# Patient Record
Sex: FEMALE | Race: WHITE | URBAN - METROPOLITAN AREA
[De-identification: names, ages, dates, MRNs, and addresses within clinical notes are randomized per-mention and may not be internally consistent; named-entity substitution may affect disease eponyms.]

---

## 2021-06-24 ENCOUNTER — TELEHEALTH PROVIDED OTHER THAN IN PATIENT'S HOME (OUTPATIENT)
Dept: URBAN - METROPOLITAN AREA TELEHEALTH 7 | Facility: TELEHEALTH | Age: 26
End: 2021-06-24
Payer: MEDICAID

## 2021-06-24 VITALS — WEIGHT: 170 LBS | HEIGHT: 63 IN

## 2021-06-24 DIAGNOSIS — K21.0 GASTRO-ESOPHAGEAL REFLUX DISEASE WITH ESOPHAGITIS: ICD-10-CM

## 2021-06-24 DIAGNOSIS — R19.8 OTHER SPECIFIED SYMPTOMS AND SIGNS INVOLVING THE DIGESTIVE S: ICD-10-CM

## 2021-06-24 PROCEDURE — 99204 OFFICE O/P NEW MOD 45 MIN: CPT | Mod: 95 | Performed by: PHYSICIAN ASSISTANT

## 2021-06-24 NOTE — SERVICEHPINOTES
PATIENT VERIFIED BY DATE OF BIRTH AND NAME. Patient has been consented for this telecommunication visit.   LIVIA BRYANT   is a   26   year old    female who is being seen in consultation at the request of   NORMA GALAN   for chronic lump in throat. Has had issues for a few years but symptoms have worsened. Has difficulty swallowing at times and feels like she doesn't have a lot of room to swallow. No pain with swallowing. Has seen ENT, had modified barium swallow which was ok. Advised to see us to R/O EoE or other abnormal findings. She has acid reflux 1-2x/week and will use Tums for this. Infrequent NSAID use. No N/V. She has long h/o IBS-type symptoms and was seen by us in 2016. No new concerns today.  Reports routine labs recently with PCP which she says were ok.

## 2021-07-02 ENCOUNTER — OFFICE (OUTPATIENT)
Dept: URBAN - METROPOLITAN AREA CLINIC 98 | Facility: CLINIC | Age: 26
End: 2021-07-02
Payer: MEDICAID

## 2021-07-02 VITALS
DIASTOLIC BLOOD PRESSURE: 60 MMHG | RESPIRATION RATE: 16 BRPM | HEART RATE: 55 BPM | DIASTOLIC BLOOD PRESSURE: 75 MMHG | HEART RATE: 70 BPM | SYSTOLIC BLOOD PRESSURE: 97 MMHG | HEART RATE: 81 BPM | HEIGHT: 63 IN | DIASTOLIC BLOOD PRESSURE: 75 MMHG | OXYGEN SATURATION: 99 % | SYSTOLIC BLOOD PRESSURE: 100 MMHG | DIASTOLIC BLOOD PRESSURE: 63 MMHG | DIASTOLIC BLOOD PRESSURE: 73 MMHG | SYSTOLIC BLOOD PRESSURE: 102 MMHG | DIASTOLIC BLOOD PRESSURE: 58 MMHG | SYSTOLIC BLOOD PRESSURE: 109 MMHG | OXYGEN SATURATION: 99 % | HEART RATE: 78 BPM | SYSTOLIC BLOOD PRESSURE: 97 MMHG | DIASTOLIC BLOOD PRESSURE: 63 MMHG | SYSTOLIC BLOOD PRESSURE: 109 MMHG | RESPIRATION RATE: 11 BRPM | HEIGHT: 63 IN | TEMPERATURE: 98.2 F | SYSTOLIC BLOOD PRESSURE: 107 MMHG | TEMPERATURE: 98.2 F | HEART RATE: 61 BPM | WEIGHT: 170 LBS | SYSTOLIC BLOOD PRESSURE: 115 MMHG | RESPIRATION RATE: 12 BRPM | SYSTOLIC BLOOD PRESSURE: 115 MMHG | DIASTOLIC BLOOD PRESSURE: 62 MMHG | OXYGEN SATURATION: 100 % | DIASTOLIC BLOOD PRESSURE: 73 MMHG | OXYGEN SATURATION: 96 % | RESPIRATION RATE: 11 BRPM | DIASTOLIC BLOOD PRESSURE: 62 MMHG | WEIGHT: 170 LBS | HEART RATE: 70 BPM | RESPIRATION RATE: 16 BRPM | SYSTOLIC BLOOD PRESSURE: 102 MMHG | SYSTOLIC BLOOD PRESSURE: 100 MMHG | HEART RATE: 81 BPM | DIASTOLIC BLOOD PRESSURE: 58 MMHG | DIASTOLIC BLOOD PRESSURE: 60 MMHG | HEART RATE: 61 BPM | SYSTOLIC BLOOD PRESSURE: 107 MMHG | HEART RATE: 78 BPM | TEMPERATURE: 98.1 F | RESPIRATION RATE: 15 BRPM | HEART RATE: 55 BPM | RESPIRATION RATE: 12 BRPM | RESPIRATION RATE: 15 BRPM | OXYGEN SATURATION: 100 % | OXYGEN SATURATION: 96 % | TEMPERATURE: 98.1 F

## 2021-07-02 DIAGNOSIS — K31.89 OTHER DISEASES OF STOMACH AND DUODENUM: ICD-10-CM

## 2021-07-02 DIAGNOSIS — R06.89 OTHER ABNORMALITIES OF BREATHING: ICD-10-CM

## 2021-07-02 DIAGNOSIS — K21.00 GASTRO-ESOPHAGEAL REFLUX DISEASE WITH ESOPHAGITIS, WITHOUT B: ICD-10-CM

## 2021-07-02 DIAGNOSIS — R19.7 DIARRHEA, UNSPECIFIED: ICD-10-CM

## 2021-07-02 DIAGNOSIS — R19.8 OTHER SPECIFIED SYMPTOMS AND SIGNS INVOLVING THE DIGESTIVE S: ICD-10-CM

## 2021-07-02 DIAGNOSIS — K21.0 GASTRO-ESOPHAGEAL REFLUX DISEASE WITH ESOPHAGITIS: ICD-10-CM

## 2021-07-02 DIAGNOSIS — K29.60 OTHER GASTRITIS WITHOUT BLEEDING: ICD-10-CM

## 2021-07-02 LAB
GI UPPER EGD HISOLOGY - SPECM 1 JAR(S): 5: (no result)
GI UPPER EGD HISOLOGY - SPECM 1 JAR(S): 5: (no result)
GI UPPER EGD HISOLOGY - SPECM 1 JAR(S): 5: PDF REPORT: (no result)
GI UPPER EGD HISOLOGY - SPECM 1 JAR(S): 5: PDF REPORT: (no result)

## 2021-07-02 PROCEDURE — 00731 ANES UPR GI NDSC PX NOS: CPT | Mod: AA,QS

## 2021-08-05 ENCOUNTER — TELEHEALTH PROVIDED OTHER THAN IN PATIENT'S HOME (OUTPATIENT)
Dept: URBAN - METROPOLITAN AREA TELEHEALTH 7 | Facility: TELEHEALTH | Age: 26
End: 2021-08-05
Payer: MEDICAID

## 2021-08-05 VITALS — HEIGHT: 63 IN | WEIGHT: 165 LBS

## 2021-08-05 DIAGNOSIS — R19.8 OTHER SPECIFIED SYMPTOMS AND SIGNS INVOLVING THE DIGESTIVE S: ICD-10-CM

## 2021-08-05 DIAGNOSIS — K21.0 GASTRO-ESOPHAGEAL REFLUX DISEASE WITH ESOPHAGITIS: ICD-10-CM

## 2021-08-05 PROCEDURE — 99213 OFFICE O/P EST LOW 20 MIN: CPT | Mod: 95 | Performed by: PHYSICIAN ASSISTANT

## 2021-08-05 RX ORDER — OMEPRAZOLE 20 MG/1
CAPSULE, DELAYED RELEASE ORAL
Qty: 30 | Refills: 0 | Status: COMPLETED
Start: 2021-08-05 | End: 2022-02-11

## 2021-08-05 NOTE — SERVICEHPINOTES
PATIENT VERIFIED BY DATE OF BIRTH AND NAME. Patient has been consented for this telecommunication visit. 25 yo female presents for f/u globus and reflux symptoms. Her EGD was normal other than some mild gastritisBR - biopsies showed normal esophagus, mild inflammation in stomach without evidence of H pylori infection, normal duodenum. OTC PPI was suggested and she would like a script. She does note that her globus sensation seems to correlate more with post-nasal drip issues when allergies are worse. Reflux symptoms are 1-2x/week and fairly mild. Doing well overall, no new concerns today.Prior hx: Patient had issues with "lump in throat" for a few years but symptoms have worsened this year. Has difficulty swallowing at times and feels like she doesn't have a lot of room to swallow. No pain with swallowing. Has seen ENT, had modified barium swallow which was ok. Advised to see us to R/O EoE or other abnormal findings. She has acid reflux 1-2x/week and will use Tums for this. Infrequent NSAID use. No N/V. She has long h/o IBS-type symptoms and was seen by us in 2016. ROS as above, otherwise negative.

## 2022-02-11 ENCOUNTER — TELEHEALTH PROVIDED OTHER THAN IN PATIENT'S HOME (OUTPATIENT)
Dept: URBAN - METROPOLITAN AREA TELEHEALTH 3 | Facility: TELEHEALTH | Age: 27
End: 2022-02-11
Payer: MEDICAID

## 2022-02-11 VITALS — WEIGHT: 185 LBS | HEIGHT: 63 IN

## 2022-02-11 DIAGNOSIS — K62.5 HEMORRHAGE OF ANUS AND RECTUM: ICD-10-CM

## 2022-02-11 DIAGNOSIS — R19.8 OTHER SPECIFIED SYMPTOMS AND SIGNS INVOLVING THE DIGESTIVE S: ICD-10-CM

## 2022-02-11 DIAGNOSIS — Z80.0 FAMILY HISTORY OF MALIGNANT NEOPLASM OF DIGESTIVE ORGANS: ICD-10-CM

## 2022-02-11 PROCEDURE — 99214 OFFICE O/P EST MOD 30 MIN: CPT | Mod: 95 | Performed by: INTERNAL MEDICINE

## 2022-02-11 NOTE — SERVICEHPINOTES
PATIENT VERIFIED BY DATE OF BIRTH AND NAME. Patient has been consented for this telecommunication visit.
br

yadira Pt presents for evaluation of suspected IBS. Last few years she has had variable BM - can have 1 BM/day, but can go 5 days without BM. Variable consistency also - can be dense "tessa" but can be loose and dark. No blood in stools, but has hemorrhoids and sometimes sees bleeding as a result (after constipation). Strains to have most BM, senses incomplete evacuation afterwards. Sometimes feels urge to have BM, but nothing happens. Has associated abdominal cramping and bloating. Nauseated at times. Hasn't tried much to help symptoms. Tried increasing dietary fiber, tries to stay hydrated trying to exercise a bit more. Nothing in specific worsens symptoms. Has known endometriosis, has had laparoscopy x3. Also had C section. No other abdominal/pelvic surgeries. Maternal GM diagnosed with colon cancer,  from it. Mother had ovarian cancer,  from it. No fevers, chills, night sweats, or weight loss.
br
br Vapes, no other tobacco use. No tobacco. No illicit drug use.br
br MBS 2021 was normal.
br EGD 2020: normal esophagus (and biopsies) normal stomach (mild gastritis on biopsies) normal duodenum (and biopsies).
br
br 10-point ROS completed with patient, pertinent positives/negatives outlined above.

## 2022-03-08 ENCOUNTER — OFFICE (OUTPATIENT)
Dept: URBAN - METROPOLITAN AREA CLINIC 30 | Facility: CLINIC | Age: 27
End: 2022-03-08
Payer: MEDICAID

## 2022-03-08 VITALS
OXYGEN SATURATION: 100 % | RESPIRATION RATE: 18 BRPM | OXYGEN SATURATION: 97 % | RESPIRATION RATE: 16 BRPM | RESPIRATION RATE: 20 BRPM | WEIGHT: 185 LBS | SYSTOLIC BLOOD PRESSURE: 124 MMHG | DIASTOLIC BLOOD PRESSURE: 67 MMHG | HEIGHT: 63 IN | OXYGEN SATURATION: 96 % | SYSTOLIC BLOOD PRESSURE: 115 MMHG | HEART RATE: 86 BPM | SYSTOLIC BLOOD PRESSURE: 121 MMHG | DIASTOLIC BLOOD PRESSURE: 73 MMHG | HEART RATE: 94 BPM | HEART RATE: 91 BPM | TEMPERATURE: 98.1 F | HEART RATE: 77 BPM | DIASTOLIC BLOOD PRESSURE: 68 MMHG | SYSTOLIC BLOOD PRESSURE: 95 MMHG | DIASTOLIC BLOOD PRESSURE: 61 MMHG

## 2022-03-08 DIAGNOSIS — R19.8 OTHER SPECIFIED SYMPTOMS AND SIGNS INVOLVING THE DIGESTIVE S: ICD-10-CM

## 2022-03-08 DIAGNOSIS — Z80.0 FAMILY HISTORY OF MALIGNANT NEOPLASM OF DIGESTIVE ORGANS: ICD-10-CM

## 2022-03-08 DIAGNOSIS — K92.2 GASTROINTESTINAL HEMORRHAGE, UNSPECIFIED: ICD-10-CM

## 2022-03-08 RX ORDER — PSYLLIUM SEED
PACKET (EA) ORAL
Qty: 1 | Refills: 0 | Status: COMPLETED
Start: 2022-03-08 | End: 2022-04-05

## 2022-03-08 NOTE — SERVICEHPINOTES
Pt presents for colonoscopy due to altered bowel function, blood per rectum, and family history of colon cancer.

## 2022-04-05 ENCOUNTER — TELEHEALTH PROVIDED OTHER THAN IN PATIENT'S HOME (OUTPATIENT)
Dept: URBAN - METROPOLITAN AREA TELEHEALTH 7 | Facility: TELEHEALTH | Age: 27
End: 2022-04-05
Payer: MEDICAID

## 2022-04-05 VITALS — WEIGHT: 183 LBS | HEIGHT: 63 IN

## 2022-04-05 DIAGNOSIS — R19.8 OTHER SPECIFIED SYMPTOMS AND SIGNS INVOLVING THE DIGESTIVE S: ICD-10-CM

## 2022-04-05 PROCEDURE — 99214 OFFICE O/P EST MOD 30 MIN: CPT | Mod: 95 | Performed by: PHYSICIAN ASSISTANT

## 2022-04-05 NOTE — SERVICEHPINOTES
PATIENT VERIFIED BY DATE OF BIRTH AND NAME. Patient has been consented for this telecommunication visit.
yadira may 
28 yo female presents for f/u irregularity and suspected IBS. Her recent colonoscopy was normal other than notation of internal and external hemorrhoids. She is wondering what can be done about the hemorrhoids. Has some rectal pain and irritation during BMs a lot of times. Can have some bleeding when constipated. Recently she's been doing fairly well with the use of Miralax, but can also have times of more frequent and looser stools. Sometimes strains to have a BM, senses incomplete evacuation afterwards. Sometimes feels urge to have BM, but nothing happens. Can have associated abdominal cramping and bloating. Nauseated at times. Hasn't tried much to help symptoms. Tried increasing dietary fiber, tries to stay hydrated trying to exercise a bit more. Nothing in specific worsens symptoms. Has known endometriosis, has had laparoscopy x3. Also had C section. No other abdominal/pelvic surgeries. Maternal GM diagnosed with colon cancer,  from it. Mother had ovarian cancer,  from it. No fevers, chills, night sweats, or weight loss.Vapes, no other tobacco use. No tobacco. No illicit drug use.EGD 2020: normal esophagus (and biopsies) normal stomach (mild gastritis on biopsies) normal duodenum (and biopsies)
yadira may Routine labs last year were ok.